# Patient Record
Sex: FEMALE | Race: BLACK OR AFRICAN AMERICAN | NOT HISPANIC OR LATINO | URBAN - METROPOLITAN AREA
[De-identification: names, ages, dates, MRNs, and addresses within clinical notes are randomized per-mention and may not be internally consistent; named-entity substitution may affect disease eponyms.]

---

## 2021-09-27 ENCOUNTER — INPATIENT (INPATIENT)
Facility: HOSPITAL | Age: 71
LOS: 6 days | Discharge: HOME | End: 2021-10-04
Attending: INTERNAL MEDICINE | Admitting: INTERNAL MEDICINE
Payer: MEDICAID

## 2021-09-27 VITALS
TEMPERATURE: 98 F | WEIGHT: 169.98 LBS | SYSTOLIC BLOOD PRESSURE: 167 MMHG | HEART RATE: 101 BPM | RESPIRATION RATE: 19 BRPM | OXYGEN SATURATION: 98 % | DIASTOLIC BLOOD PRESSURE: 102 MMHG | HEIGHT: 72 IN

## 2021-09-27 DIAGNOSIS — R53.1 WEAKNESS: ICD-10-CM

## 2021-09-27 DIAGNOSIS — E78.5 HYPERLIPIDEMIA, UNSPECIFIED: ICD-10-CM

## 2021-09-27 DIAGNOSIS — I50.9 HEART FAILURE, UNSPECIFIED: ICD-10-CM

## 2021-09-27 DIAGNOSIS — I10 ESSENTIAL (PRIMARY) HYPERTENSION: ICD-10-CM

## 2021-09-27 DIAGNOSIS — E11.8 TYPE 2 DIABETES MELLITUS WITH UNSPECIFIED COMPLICATIONS: ICD-10-CM

## 2021-09-27 DIAGNOSIS — G20 PARKINSON'S DISEASE: ICD-10-CM

## 2021-09-27 DIAGNOSIS — E87.5 HYPERKALEMIA: ICD-10-CM

## 2021-09-27 LAB
ALBUMIN SERPL ELPH-MCNC: 3.3 G/DL — SIGNIFICANT CHANGE UP (ref 3.3–5)
ALP SERPL-CCNC: 98 U/L — SIGNIFICANT CHANGE UP (ref 40–120)
ALT FLD-CCNC: 29 U/L — SIGNIFICANT CHANGE UP (ref 12–78)
ANION GAP SERPL CALC-SCNC: 3 MMOL/L — LOW (ref 5–17)
AST SERPL-CCNC: 43 U/L — HIGH (ref 15–37)
BASOPHILS # BLD AUTO: 0.05 K/UL — SIGNIFICANT CHANGE UP (ref 0–0.2)
BASOPHILS NFR BLD AUTO: 0.8 % — SIGNIFICANT CHANGE UP (ref 0–2)
BILIRUB SERPL-MCNC: 0.8 MG/DL — SIGNIFICANT CHANGE UP (ref 0.2–1.2)
BUN SERPL-MCNC: 11 MG/DL — SIGNIFICANT CHANGE UP (ref 7–23)
CALCIUM SERPL-MCNC: 9.2 MG/DL — SIGNIFICANT CHANGE UP (ref 8.5–10.1)
CHLORIDE SERPL-SCNC: 102 MMOL/L — SIGNIFICANT CHANGE UP (ref 96–108)
CK SERPL-CCNC: 60 U/L — SIGNIFICANT CHANGE UP (ref 26–192)
CO2 SERPL-SCNC: 27 MMOL/L — SIGNIFICANT CHANGE UP (ref 22–31)
CREAT SERPL-MCNC: 0.71 MG/DL — SIGNIFICANT CHANGE UP (ref 0.5–1.3)
EOSINOPHIL # BLD AUTO: 0.01 K/UL — SIGNIFICANT CHANGE UP (ref 0–0.5)
EOSINOPHIL NFR BLD AUTO: 0.2 % — SIGNIFICANT CHANGE UP (ref 0–6)
FLUAV AG NPH QL: SIGNIFICANT CHANGE UP
FLUBV AG NPH QL: SIGNIFICANT CHANGE UP
GLUCOSE BLDC GLUCOMTR-MCNC: 110 MG/DL — HIGH (ref 70–99)
GLUCOSE BLDC GLUCOMTR-MCNC: 210 MG/DL — HIGH (ref 70–99)
GLUCOSE SERPL-MCNC: 136 MG/DL — HIGH (ref 70–99)
HCT VFR BLD CALC: 45.5 % — HIGH (ref 34.5–45)
HGB BLD-MCNC: 15.3 G/DL — SIGNIFICANT CHANGE UP (ref 11.5–15.5)
IMM GRANULOCYTES NFR BLD AUTO: 0.2 % — SIGNIFICANT CHANGE UP (ref 0–1.5)
LYMPHOCYTES # BLD AUTO: 1.94 K/UL — SIGNIFICANT CHANGE UP (ref 1–3.3)
LYMPHOCYTES # BLD AUTO: 31.6 % — SIGNIFICANT CHANGE UP (ref 13–44)
MCHC RBC-ENTMCNC: 30.2 PG — SIGNIFICANT CHANGE UP (ref 27–34)
MCHC RBC-ENTMCNC: 33.6 GM/DL — SIGNIFICANT CHANGE UP (ref 32–36)
MCV RBC AUTO: 89.7 FL — SIGNIFICANT CHANGE UP (ref 80–100)
MONOCYTES # BLD AUTO: 0.49 K/UL — SIGNIFICANT CHANGE UP (ref 0–0.9)
MONOCYTES NFR BLD AUTO: 8 % — SIGNIFICANT CHANGE UP (ref 2–14)
NEUTROPHILS # BLD AUTO: 3.63 K/UL — SIGNIFICANT CHANGE UP (ref 1.8–7.4)
NEUTROPHILS NFR BLD AUTO: 59.2 % — SIGNIFICANT CHANGE UP (ref 43–77)
NRBC # BLD: 0 /100 WBCS — SIGNIFICANT CHANGE UP (ref 0–0)
PLATELET # BLD AUTO: 319 K/UL — SIGNIFICANT CHANGE UP (ref 150–400)
POTASSIUM SERPL-MCNC: 5.5 MMOL/L — HIGH (ref 3.5–5.3)
POTASSIUM SERPL-SCNC: 5.5 MMOL/L — HIGH (ref 3.5–5.3)
PROT SERPL-MCNC: 7.7 GM/DL — SIGNIFICANT CHANGE UP (ref 6–8.3)
RBC # BLD: 5.07 M/UL — SIGNIFICANT CHANGE UP (ref 3.8–5.2)
RBC # FLD: 12.3 % — SIGNIFICANT CHANGE UP (ref 10.3–14.5)
SARS-COV-2 RNA SPEC QL NAA+PROBE: SIGNIFICANT CHANGE UP
SODIUM SERPL-SCNC: 132 MMOL/L — LOW (ref 135–145)
TROPONIN I SERPL-MCNC: <.015 NG/ML — SIGNIFICANT CHANGE UP (ref 0.01–0.04)
TSH SERPL-MCNC: 0.76 UIU/ML — SIGNIFICANT CHANGE UP (ref 0.36–3.74)
WBC # BLD: 6.13 K/UL — SIGNIFICANT CHANGE UP (ref 3.8–10.5)
WBC # FLD AUTO: 6.13 K/UL — SIGNIFICANT CHANGE UP (ref 3.8–10.5)

## 2021-09-27 PROCEDURE — 71045 X-RAY EXAM CHEST 1 VIEW: CPT | Mod: 26

## 2021-09-27 PROCEDURE — 70450 CT HEAD/BRAIN W/O DYE: CPT | Mod: 26,MA

## 2021-09-27 PROCEDURE — 99221 1ST HOSP IP/OBS SF/LOW 40: CPT

## 2021-09-27 PROCEDURE — 99285 EMERGENCY DEPT VISIT HI MDM: CPT

## 2021-09-27 PROCEDURE — 93010 ELECTROCARDIOGRAM REPORT: CPT

## 2021-09-27 RX ORDER — DEXTROSE 50 % IN WATER 50 %
25 SYRINGE (ML) INTRAVENOUS ONCE
Refills: 0 | Status: DISCONTINUED | OUTPATIENT
Start: 2021-09-27 | End: 2021-10-04

## 2021-09-27 RX ORDER — GLUCAGON INJECTION, SOLUTION 0.5 MG/.1ML
1 INJECTION, SOLUTION SUBCUTANEOUS ONCE
Refills: 0 | Status: DISCONTINUED | OUTPATIENT
Start: 2021-09-27 | End: 2021-10-04

## 2021-09-27 RX ORDER — SODIUM CHLORIDE 9 MG/ML
1000 INJECTION, SOLUTION INTRAVENOUS
Refills: 0 | Status: DISCONTINUED | OUTPATIENT
Start: 2021-09-27 | End: 2021-10-04

## 2021-09-27 RX ORDER — CARBIDOPA AND LEVODOPA 25; 100 MG/1; MG/1
1 TABLET ORAL ONCE
Refills: 0 | Status: COMPLETED | OUTPATIENT
Start: 2021-09-27 | End: 2021-09-27

## 2021-09-27 RX ORDER — LANOLIN ALCOHOL/MO/W.PET/CERES
3 CREAM (GRAM) TOPICAL AT BEDTIME
Refills: 0 | Status: DISCONTINUED | OUTPATIENT
Start: 2021-09-27 | End: 2021-10-04

## 2021-09-27 RX ORDER — INSULIN LISPRO 100/ML
VIAL (ML) SUBCUTANEOUS
Refills: 0 | Status: DISCONTINUED | OUTPATIENT
Start: 2021-09-27 | End: 2021-10-04

## 2021-09-27 RX ORDER — ENOXAPARIN SODIUM 100 MG/ML
40 INJECTION SUBCUTANEOUS DAILY
Refills: 0 | Status: DISCONTINUED | OUTPATIENT
Start: 2021-09-27 | End: 2021-10-04

## 2021-09-27 RX ORDER — ACETAMINOPHEN 500 MG
650 TABLET ORAL EVERY 6 HOURS
Refills: 0 | Status: DISCONTINUED | OUTPATIENT
Start: 2021-09-27 | End: 2021-10-04

## 2021-09-27 RX ORDER — CARBIDOPA AND LEVODOPA 25; 100 MG/1; MG/1
1 TABLET ORAL
Refills: 0 | Status: DISCONTINUED | OUTPATIENT
Start: 2021-09-27 | End: 2021-09-28

## 2021-09-27 RX ORDER — SODIUM POLYSTYRENE SULFONATE 4.1 MEQ/G
15 POWDER, FOR SUSPENSION ORAL ONCE
Refills: 0 | Status: COMPLETED | OUTPATIENT
Start: 2021-09-27 | End: 2021-09-27

## 2021-09-27 RX ORDER — DEXTROSE 50 % IN WATER 50 %
15 SYRINGE (ML) INTRAVENOUS ONCE
Refills: 0 | Status: DISCONTINUED | OUTPATIENT
Start: 2021-09-27 | End: 2021-10-04

## 2021-09-27 RX ORDER — ATORVASTATIN CALCIUM 80 MG/1
40 TABLET, FILM COATED ORAL AT BEDTIME
Refills: 0 | Status: DISCONTINUED | OUTPATIENT
Start: 2021-09-27 | End: 2021-10-04

## 2021-09-27 RX ORDER — SODIUM CHLORIDE 9 MG/ML
1000 INJECTION INTRAMUSCULAR; INTRAVENOUS; SUBCUTANEOUS ONCE
Refills: 0 | Status: COMPLETED | OUTPATIENT
Start: 2021-09-27 | End: 2021-09-27

## 2021-09-27 RX ORDER — INSULIN LISPRO 100/ML
VIAL (ML) SUBCUTANEOUS AT BEDTIME
Refills: 0 | Status: DISCONTINUED | OUTPATIENT
Start: 2021-09-27 | End: 2021-10-04

## 2021-09-27 RX ORDER — DEXTROSE 50 % IN WATER 50 %
12.5 SYRINGE (ML) INTRAVENOUS ONCE
Refills: 0 | Status: DISCONTINUED | OUTPATIENT
Start: 2021-09-27 | End: 2021-10-04

## 2021-09-27 RX ORDER — SODIUM ZIRCONIUM CYCLOSILICATE 10 G/10G
10 POWDER, FOR SUSPENSION ORAL ONCE
Refills: 0 | Status: DISCONTINUED | OUTPATIENT
Start: 2021-09-27 | End: 2021-09-27

## 2021-09-27 RX ORDER — AMLODIPINE BESYLATE 2.5 MG/1
5 TABLET ORAL DAILY
Refills: 0 | Status: DISCONTINUED | OUTPATIENT
Start: 2021-09-27 | End: 2021-10-04

## 2021-09-27 RX ADMIN — SODIUM POLYSTYRENE SULFONATE 15 GRAM(S): 4.1 POWDER, FOR SUSPENSION ORAL at 16:41

## 2021-09-27 RX ADMIN — CARBIDOPA AND LEVODOPA 1 TABLET(S): 25; 100 TABLET ORAL at 16:41

## 2021-09-27 RX ADMIN — AMLODIPINE BESYLATE 5 MILLIGRAM(S): 2.5 TABLET ORAL at 16:41

## 2021-09-27 RX ADMIN — CARBIDOPA AND LEVODOPA 1 TABLET(S): 25; 100 TABLET ORAL at 19:00

## 2021-09-27 RX ADMIN — ENOXAPARIN SODIUM 40 MILLIGRAM(S): 100 INJECTION SUBCUTANEOUS at 16:41

## 2021-09-27 RX ADMIN — ATORVASTATIN CALCIUM 40 MILLIGRAM(S): 80 TABLET, FILM COATED ORAL at 22:30

## 2021-09-27 RX ADMIN — SODIUM CHLORIDE 1000 MILLILITER(S): 9 INJECTION INTRAMUSCULAR; INTRAVENOUS; SUBCUTANEOUS at 16:43

## 2021-09-27 RX ADMIN — CARBIDOPA AND LEVODOPA 1 TABLET(S): 25; 100 TABLET ORAL at 23:34

## 2021-09-27 NOTE — PHYSICAL THERAPY INITIAL EVALUATION ADULT - STRENGTHENING, PT EVAL
Improve strength in B UE/LE to 5/5  and be able to perform functional tasks-bed mobility, sitting, standing, transfers and ambulate in a safe manner with or without  assistive device and prevent falls.

## 2021-09-27 NOTE — ED ADULT NURSE NOTE - OBJECTIVE STATEMENT
pt alert awake interactive. pt states recently diagnosed with parkinson's this year, c.o of worsening weakness, inability to walk and uses wheelchair at home. pt denies pain no falls at home recently. diet has been poor appetite  no known difficulty swallowing.

## 2021-09-27 NOTE — PHYSICAL THERAPY INITIAL EVALUATION ADULT - DID THE PATIENT HAVE SURGERY?
This is the hx of JASMIN a 70 y/o female patient who was admitted to [facility] due to complications of Parkinson's disease affecting medical condition and with subsequent affection on functional mobility./n/a

## 2021-09-27 NOTE — ED ADULT NURSE NOTE - NSICDXPASTMEDICALHX_GEN_ALL_CORE_FT
PAST MEDICAL HISTORY:  DM (diabetes mellitus)     Heart failure     Mild HTN     Parkinsons     Parkinsons

## 2021-09-27 NOTE — PHYSICAL THERAPY INITIAL EVALUATION ADULT - BALANCE TRAINING, PT EVAL
Pt will increase static/dynamic standing balance to WFL to perform all functional mobility without LOB, by 2-4  weeks

## 2021-09-27 NOTE — ED ADULT NURSE NOTE - NSIMPLEMENTINTERV_GEN_ALL_ED
Implemented All Fall with Harm Risk Interventions:  [place] to call system. Call bell, personal items and telephone within reach. Instruct patient to call for assistance. Room bathroom lighting operational. Non-slip footwear when patient is off stretcher. Physically safe environment: no spills, clutter or unnecessary equipment. Stretcher in lowest position, wheels locked, appropriate side rails in place. Provide visual cue, wrist band, yellow gown, etc. Monitor gait and stability. Monitor for mental status changes and reorient to person, place, and time. Review medications for side effects contributing to fall risk. Reinforce activity limits and safety measures with patient and family. Provide visual clues: red socks.

## 2021-09-27 NOTE — H&P ADULT - PROBLEM SELECTOR PLAN 4
Patient does not remember home medications  Informed patient's to ask family member to bring home medication list  Continue amlodipine 5mg daily for now with holding parameters

## 2021-09-27 NOTE — PHYSICAL THERAPY INITIAL EVALUATION ADULT - ADDITIONAL COMMENTS
as per patient she lives at home with sister and brother in Law + nephew, pt has 3 steps with 2 HR's (close together), pt was independent with rolling walker. pt owns a walker, 3:1 commode, cane, w/c. pt lives in the first floor and sister's family lives in the second floor.

## 2021-09-27 NOTE — H&P ADULT - NSHPREVIEWOFSYSTEMS_GEN_ALL_CORE
CONSTITUTIONAL: No fever, chills or weight loss. Generalized weakness +  EYES: No eye pain. No vision changes  ENT:  No hearing changes or pain, No nasal congestion.   Cardiovascular:  No Chest Pain, palpitation, SOB orthopnea or PND.   Respiratory: no cough, SOB, wheezing  Gastrointestinal:  No nausea, vomiting, diarrhea, constipation or abdominal pain  Genitourinary: No dysuria, frequency or hematuria  Musculoskeletal:  No myalgia or joint pain  Skin:  No skin lesion, rash or pruritis  Neuro:  generalized weakness  Psych:  Reportedly sad due to Parkinson  Heme/Lymph:  No easy bruising or bleeding. No enlarged lymph nodes  Endocrine:  No Polyuria, polydipsia, No heat or cold intolerance.  ALLERGIC/IMMUNOlOGIC: No seasonal allergy, hives, hay fever symptoms

## 2021-09-27 NOTE — H&P ADULT - ASSESSMENT
71 years old female with h/o HTN, HLD, CHF, DM2, recently diagnosed Parkinson's diease present to ED with complain of worsening generalized weakness for 2 weeks.  Hemodynamically stable. No leukocytosis. K 5.5, trop negative. CT head with no acute pathology, mid to moderate chronic microvascular changes. ED consulted neurology Dr Malone. Given 1 dose of Sinemet 25/100 and Lokelma 10G in ED.  Patient live with her sister and ambulate with wheelchair. Her sister reportedly has MS as well. Difficulty in taking care of herself due to weakness  Called [facility] pharmacy ( [phone]). They are not sure of patient's regular medications She was discharged in July with Sinemet 25/100 qid. Other medications at different point of times include lasix 40mg daily, metformin 500mg, amlodipine 5mg, atorvastatin 40mg, metoprolol ER 50, gabapentin 100mg but they are not sure which one of them are current.     Admitted with generalized weakness due to worsening Parkinson's disease, hyperkalemia 71 years old female with h/o HTN, HLD, CHF, DM2, recently diagnosed Parkinson's diease present to ED with complain of worsening generalized weakness for 2 weeks.  Hemodynamically stable. No leukocytosis. K 5.5, trop negative. CT head with no acute pathology, mid to moderate chronic microvascular changes. ED consulted neurology Dr Malone. Given 1 dose of Sinemet 25/100  in ED.  Patient live with her sister and ambulate with wheelchair. Her sister reportedly has MS as well. Difficulty in taking care of herself due to weakness  Called [facility] pharmacy ( [phone]). They are not sure of patient's regular medications She was discharged in July with Sinemet 25/100 qid. Other medications at different point of times include lasix 40mg daily, metformin 500mg, amlodipine 5mg, atorvastatin 40mg, metoprolol ER 50, gabapentin 100mg but they are not sure which one of them are current.     Admitted with generalized weakness due to worsening Parkinson's disease, hyperkalemia

## 2021-09-27 NOTE — H&P ADULT - PROBLEM SELECTOR PLAN 3
Present to ED with worsening weakness since diagnosis of Parkinson's disease  Unable to stand up due to weakness  CT head with no acute pathology, mid to moderate chronic microvascular changes  ED consulted neurology Dr Malone. Given 1 dose of Sinemet 25/100 in ED  Continue Sinemet 25/100 qid for now  PT consult

## 2021-09-27 NOTE — PHYSICAL THERAPY INITIAL EVALUATION ADULT - PERTINENT HX OF CURRENT PROBLEM, REHAB EVAL
This is the hx of JASMIN a 72 y/o female patient who was admitted to [facility] [place] [place] due to complications of Parkinson's disease affecting medical condition and with subsequent affection on functional mobility.

## 2021-09-27 NOTE — H&P ADULT - NSHPPHYSICALEXAM_GEN_ALL_CORE
CONSTITUTIONAL: Well developed, well nourished, alert and cooperative, no acute distress. BP-167/102 , HR-101 , RR-19  EYES: PERRL, EOMI, no scleral icterus  ENT: Mucosa moist, tongue normal  NECK: Neck supple, trachea midline, non-tender, no masses or thyromegaly.  CARDIAC: Normal S1 and S2. Regular rate and rhythms. No murmurs. No Pedal edema. Peripheral pulses intact  LUNGS: Clear to auscultation, equal air entry both lungs. No rales, rhonchi, wheezing. Normal respiratory effort.   ABDOMEN: Soft, nondistended, nontender. No guarding or rebound tenderness. No hepatomegaly or splenomegaly  MUSCULOSKELETAL: Normocephalic, atraumatic. Spine normal without deformity or tenderness. No clubbing or cyanosis. No significant deformity or joint abnormality  NEUROLOGICAL: Sensory intact. Motor bilateral LE 4/5, mild rigidity in bilateral LE noted . CN II-XII grossly intact.  SKIN: no lesions or eruptions. Normal turgor  PSYCHIATRIC: A&O x 3, appear sad and crying. Normal insight and judgement.

## 2021-09-27 NOTE — H&P ADULT - PROBLEM SELECTOR PLAN 7
Reported h/o CHF, unknown EF  Possible on lasix 40mg daily  Clinically appear euvolemic  Hold off diuretic for now until home medications is confirmed

## 2021-09-27 NOTE — PHYSICAL THERAPY INITIAL EVALUATION ADULT - CRITERIA FOR SKILLED THERAPEUTIC INTERVENTIONS
Subacute rehab/impairments found/functional limitations in following categories/risk reduction/prevention/rehab potential/therapy frequency/predicted duration of therapy intervention/anticipated discharge recommendation

## 2021-09-27 NOTE — H&P ADULT - HISTORY OF PRESENT ILLNESS
71 years old female with h/o HTN, HLD, CHF, DM2, recently diagnosed Parkinson's diease present to ED with complain of worsening generalized weakness for 2 weeks. Patient reported gradual worsening of weakness since diagnosis of Parkinson, but more pronounced over last 2 week. Now patient cannot ambulate due to weakness. She live with her sister and ambulate with a wheelchair. No fever, cough, SOB or chest pain.  Hemodynamically stable. No leukocytosis. K 5.5, trop negative. CT head with no acute pathology, mid to moderate chronic microvascular changes.ED consulted neurology Dr Malone. Given 1 dose of Sinemet 25/100 and Lokelma 10G in ED  Called [facility] pharmacy ( [phone]). They are not sure of patient's regular medications She was discharged in July with Sinemet 25/100 qid. Other medications at different point of times include lasix 40mg daily, metformin 500mg, amlodipine 5mg, atorvastatin 40mg, metoprolol ER 50 but they are not sure if patient is still taking them.    PMH: as in HPI  SH: no toxic habits  FH: DM in father and mother 71 years old female with h/o HTN, HLD, CHF, DM2, recently diagnosed Parkinson's diease present to ED with complain of worsening generalized weakness for 2 weeks. Patient reported gradual worsening of weakness since diagnosis of Parkinson, but more pronounced over last 2 week. Now patient cannot ambulate due to weakness. She live with her sister and ambulate with a wheelchair. No fever, cough, SOB or chest pain.  Hemodynamically stable. No leukocytosis. K 5.5, trop negative. CT head with no acute pathology, mid to moderate chronic microvascular changes.ED consulted neurology Dr Malone. Given 1 dose of Sinemet 25/100 in ED  Called [facility] pharmacy ( [phone]). They are not sure of patient's regular medications She was discharged in July with Sinemet 25/100 qid. Other medications at different point of times include lasix 40mg daily, metformin 500mg, amlodipine 5mg, atorvastatin 40mg, metoprolol ER 50 but they are not sure if patient is still taking them.    PMH: as in HPI  SH: no toxic habits  FH: DM in father and mother

## 2021-09-27 NOTE — H&P ADULT - PROBLEM SELECTOR PLAN 2
K 5.5, received Lokelma 10G in ED  Monitor serum potassium K 5.5  Kayexalate 15g ordered  Monitor serum potassium

## 2021-09-27 NOTE — ED PROVIDER NOTE - OBJECTIVE STATEMENT
71 year old female with PMH of CHF, Parkinson's HTN, DM II presenting to ED due to bilateral feet weakness x 3 months. Since diagnosis with problem has had bilateral feet weakness, difficulty speaking. States on medication - levo-carbidopa for issue.

## 2021-09-27 NOTE — PHYSICAL THERAPY INITIAL EVALUATION ADULT - BED MOBILITY TRAINING, PT EVAL
Pt will independently perform all aspects of bed mobility to help prevent pressure ulcers, by 2-4 weeks

## 2021-09-27 NOTE — ED PROVIDER NOTE - CLINICAL SUMMARY MEDICAL DECISION MAKING FREE TEXT BOX
pt with parkinson's disease, worsening, difficulty standing and ambulating, bradykinesia noted on exam, will need dose adjustments on Carbi/Levodopa - unsure of pharmacy - called pharmacy listed but pt has not picked up meds from [facility] before - will admit for further care with Dr. Eddy.

## 2021-09-28 LAB
A1C WITH ESTIMATED AVERAGE GLUCOSE RESULT: 6.6 % — HIGH (ref 4–5.6)
ANION GAP SERPL CALC-SCNC: 7 MMOL/L — SIGNIFICANT CHANGE UP (ref 5–17)
BUN SERPL-MCNC: 10 MG/DL — SIGNIFICANT CHANGE UP (ref 7–23)
CALCIUM SERPL-MCNC: 9.4 MG/DL — SIGNIFICANT CHANGE UP (ref 8.5–10.1)
CHLORIDE SERPL-SCNC: 104 MMOL/L — SIGNIFICANT CHANGE UP (ref 96–108)
CO2 SERPL-SCNC: 26 MMOL/L — SIGNIFICANT CHANGE UP (ref 22–31)
COVID-19 SPIKE DOMAIN AB INTERP: NEGATIVE — SIGNIFICANT CHANGE UP
COVID-19 SPIKE DOMAIN ANTIBODY RESULT: 0.4 U/ML — SIGNIFICANT CHANGE UP
CREAT SERPL-MCNC: 0.46 MG/DL — LOW (ref 0.5–1.3)
ESTIMATED AVERAGE GLUCOSE: 143 MG/DL — HIGH (ref 68–114)
GLUCOSE BLDC GLUCOMTR-MCNC: 129 MG/DL — HIGH (ref 70–99)
GLUCOSE BLDC GLUCOMTR-MCNC: 157 MG/DL — HIGH (ref 70–99)
GLUCOSE BLDC GLUCOMTR-MCNC: 248 MG/DL — HIGH (ref 70–99)
GLUCOSE BLDC GLUCOMTR-MCNC: 99 MG/DL — SIGNIFICANT CHANGE UP (ref 70–99)
GLUCOSE SERPL-MCNC: 105 MG/DL — HIGH (ref 70–99)
HCT VFR BLD CALC: 40.3 % — SIGNIFICANT CHANGE UP (ref 34.5–45)
HCV AB S/CO SERPL IA: 0.43 S/CO — SIGNIFICANT CHANGE UP (ref 0–0.99)
HCV AB SERPL-IMP: SIGNIFICANT CHANGE UP
HGB BLD-MCNC: 13.6 G/DL — SIGNIFICANT CHANGE UP (ref 11.5–15.5)
MAGNESIUM SERPL-MCNC: 2.1 MG/DL — SIGNIFICANT CHANGE UP (ref 1.6–2.6)
MCHC RBC-ENTMCNC: 30.1 PG — SIGNIFICANT CHANGE UP (ref 27–34)
MCHC RBC-ENTMCNC: 33.7 GM/DL — SIGNIFICANT CHANGE UP (ref 32–36)
MCV RBC AUTO: 89.2 FL — SIGNIFICANT CHANGE UP (ref 80–100)
NRBC # BLD: 0 /100 WBCS — SIGNIFICANT CHANGE UP (ref 0–0)
PHOSPHATE SERPL-MCNC: 3.5 MG/DL — SIGNIFICANT CHANGE UP (ref 2.5–4.5)
PLATELET # BLD AUTO: 270 K/UL — SIGNIFICANT CHANGE UP (ref 150–400)
POTASSIUM SERPL-MCNC: 3.2 MMOL/L — LOW (ref 3.5–5.3)
POTASSIUM SERPL-SCNC: 3.2 MMOL/L — LOW (ref 3.5–5.3)
RBC # BLD: 4.52 M/UL — SIGNIFICANT CHANGE UP (ref 3.8–5.2)
RBC # FLD: 12.2 % — SIGNIFICANT CHANGE UP (ref 10.3–14.5)
SARS-COV-2 IGG+IGM SERPL QL IA: 0.4 U/ML — SIGNIFICANT CHANGE UP
SARS-COV-2 IGG+IGM SERPL QL IA: NEGATIVE — SIGNIFICANT CHANGE UP
SODIUM SERPL-SCNC: 137 MMOL/L — SIGNIFICANT CHANGE UP (ref 135–145)
T4 FREE SERPL-MCNC: 1.5 NG/DL — SIGNIFICANT CHANGE UP (ref 0.9–1.8)
WBC # BLD: 5.76 K/UL — SIGNIFICANT CHANGE UP (ref 3.8–10.5)
WBC # FLD AUTO: 5.76 K/UL — SIGNIFICANT CHANGE UP (ref 3.8–10.5)

## 2021-09-28 PROCEDURE — 99232 SBSQ HOSP IP/OBS MODERATE 35: CPT

## 2021-09-28 PROCEDURE — 99254 IP/OBS CNSLTJ NEW/EST MOD 60: CPT

## 2021-09-28 RX ORDER — CARBIDOPA AND LEVODOPA 25; 100 MG/1; MG/1
1 TABLET ORAL
Refills: 0 | Status: DISCONTINUED | OUTPATIENT
Start: 2021-09-28 | End: 2021-09-28

## 2021-09-28 RX ORDER — POTASSIUM CHLORIDE 20 MEQ
40 PACKET (EA) ORAL EVERY 4 HOURS
Refills: 0 | Status: DISCONTINUED | OUTPATIENT
Start: 2021-09-28 | End: 2021-09-28

## 2021-09-28 RX ORDER — INFLUENZA VIRUS VACCINE 15; 15; 15; 15 UG/.5ML; UG/.5ML; UG/.5ML; UG/.5ML
0.5 SUSPENSION INTRAMUSCULAR ONCE
Refills: 0 | Status: COMPLETED | OUTPATIENT
Start: 2021-09-28 | End: 2021-10-04

## 2021-09-28 RX ORDER — CARBIDOPA AND LEVODOPA 25; 100 MG/1; MG/1
1 TABLET ORAL THREE TIMES A DAY
Refills: 0 | Status: DISCONTINUED | OUTPATIENT
Start: 2021-09-28 | End: 2021-10-04

## 2021-09-28 RX ADMIN — Medication 650 MILLIGRAM(S): at 14:18

## 2021-09-28 RX ADMIN — CARBIDOPA AND LEVODOPA 1 TABLET(S): 25; 100 TABLET ORAL at 18:42

## 2021-09-28 RX ADMIN — CARBIDOPA AND LEVODOPA 1 TABLET(S): 25; 100 TABLET ORAL at 05:47

## 2021-09-28 RX ADMIN — ENOXAPARIN SODIUM 40 MILLIGRAM(S): 100 INJECTION SUBCUTANEOUS at 12:03

## 2021-09-28 RX ADMIN — ATORVASTATIN CALCIUM 40 MILLIGRAM(S): 80 TABLET, FILM COATED ORAL at 22:43

## 2021-09-28 RX ADMIN — AMLODIPINE BESYLATE 5 MILLIGRAM(S): 2.5 TABLET ORAL at 05:47

## 2021-09-28 RX ADMIN — Medication 650 MILLIGRAM(S): at 13:11

## 2021-09-28 RX ADMIN — Medication 1: at 18:45

## 2021-09-28 RX ADMIN — Medication 2: at 11:48

## 2021-09-28 RX ADMIN — CARBIDOPA AND LEVODOPA 1 TABLET(S): 25; 100 TABLET ORAL at 11:49

## 2021-09-28 RX ADMIN — CARBIDOPA AND LEVODOPA 1 TABLET(S): 25; 100 TABLET ORAL at 22:43

## 2021-09-28 NOTE — CONSULT NOTE ADULT - SUBJECTIVE AND OBJECTIVE BOX
NEUROLOGY CONSULT    HPI:  72 yo woman reporting that over the past few months she has had gait instability and slow movements, and about 2 weeks ago was told that she may have Parkinson disease which was very upsetting, and she feels her symptoms have gotten worse since she was told this.  She denies any prominent resting tremors, but has had bradykinesia and postural instability.   She has been very unsteady while walking and was using a walker, and now a wheelchair more often.    PMHx: HTN, DM, CHF, DM    Head CT: diffuse atrophy    Hgb A1C = 6.6  TSH = 0.76  CPK = 60    NEUROLOGICAL EXAM:  T 97.4 F  /86  HR 80  MS: Awake, alert, oriented x 4, language fluent, comprehension intact, naming intact, repetition intact, reduced facial expression  CN: PERRLA, EOMI, visual fields intact, facial sensation intact, face symmetric, no dysarthria, symmetric palatal elevation, tongue midline, symmetric shoulder shrug and SCM strength  Motor: normal bulk, diffuse rigidity, power 5/5 in all four extremities, no tremors or fasciculations, +bradykinesia in all four limbs  Sensation: intact to light touch, proprioception, impaired vibrations sense in toes bilaterally  Reflexes: 1+ at biceps, brachioradialis, patella, ankle bilaterally.  Flexor plantar response bilaterally.  No clonus  Coordination: no dysmetria    Assessment:  72 yo woman with Parkinsonism.  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    Recommendations:  1. Agree with trial of Sinemet 25/100mg PO TID  2. PT/OT  3. would benefit from consultation with Movement disorders specialist ( ie Karon Palumbo, or Tremaine in [place], can be arranged as outpatient upon follow up)    Fernando Malone MD  [facility] Department of Neurology  Epilepsy Center

## 2021-09-29 LAB
ANION GAP SERPL CALC-SCNC: 4 MMOL/L — LOW (ref 5–17)
BUN SERPL-MCNC: 11 MG/DL — SIGNIFICANT CHANGE UP (ref 7–23)
CALCIUM SERPL-MCNC: 9.1 MG/DL — SIGNIFICANT CHANGE UP (ref 8.5–10.1)
CHLORIDE SERPL-SCNC: 106 MMOL/L — SIGNIFICANT CHANGE UP (ref 96–108)
CO2 SERPL-SCNC: 29 MMOL/L — SIGNIFICANT CHANGE UP (ref 22–31)
CREAT SERPL-MCNC: 0.68 MG/DL — SIGNIFICANT CHANGE UP (ref 0.5–1.3)
GLUCOSE BLDC GLUCOMTR-MCNC: 122 MG/DL — HIGH (ref 70–99)
GLUCOSE BLDC GLUCOMTR-MCNC: 223 MG/DL — HIGH (ref 70–99)
GLUCOSE BLDC GLUCOMTR-MCNC: 225 MG/DL — HIGH (ref 70–99)
GLUCOSE BLDC GLUCOMTR-MCNC: 246 MG/DL — HIGH (ref 70–99)
GLUCOSE SERPL-MCNC: 135 MG/DL — HIGH (ref 70–99)
POTASSIUM SERPL-MCNC: 4.4 MMOL/L — SIGNIFICANT CHANGE UP (ref 3.5–5.3)
POTASSIUM SERPL-SCNC: 4.4 MMOL/L — SIGNIFICANT CHANGE UP (ref 3.5–5.3)
SODIUM SERPL-SCNC: 139 MMOL/L — SIGNIFICANT CHANGE UP (ref 135–145)

## 2021-09-29 PROCEDURE — 99232 SBSQ HOSP IP/OBS MODERATE 35: CPT

## 2021-09-29 RX ADMIN — ATORVASTATIN CALCIUM 40 MILLIGRAM(S): 80 TABLET, FILM COATED ORAL at 22:37

## 2021-09-29 RX ADMIN — CARBIDOPA AND LEVODOPA 1 TABLET(S): 25; 100 TABLET ORAL at 06:18

## 2021-09-29 RX ADMIN — CARBIDOPA AND LEVODOPA 1 TABLET(S): 25; 100 TABLET ORAL at 13:50

## 2021-09-29 RX ADMIN — Medication 2: at 12:28

## 2021-09-29 RX ADMIN — AMLODIPINE BESYLATE 5 MILLIGRAM(S): 2.5 TABLET ORAL at 06:17

## 2021-09-29 RX ADMIN — Medication 2: at 17:38

## 2021-09-29 RX ADMIN — CARBIDOPA AND LEVODOPA 1 TABLET(S): 25; 100 TABLET ORAL at 22:37

## 2021-09-29 RX ADMIN — ENOXAPARIN SODIUM 40 MILLIGRAM(S): 100 INJECTION SUBCUTANEOUS at 12:30

## 2021-09-29 RX ADMIN — Medication 3 MILLIGRAM(S): at 22:37

## 2021-09-30 LAB
GLUCOSE BLDC GLUCOMTR-MCNC: 130 MG/DL — HIGH (ref 70–99)
GLUCOSE BLDC GLUCOMTR-MCNC: 210 MG/DL — HIGH (ref 70–99)
GLUCOSE BLDC GLUCOMTR-MCNC: 212 MG/DL — HIGH (ref 70–99)
GLUCOSE BLDC GLUCOMTR-MCNC: 248 MG/DL — HIGH (ref 70–99)

## 2021-09-30 PROCEDURE — 99232 SBSQ HOSP IP/OBS MODERATE 35: CPT

## 2021-09-30 RX ORDER — ATORVASTATIN CALCIUM 80 MG/1
1 TABLET, FILM COATED ORAL
Qty: 0 | Refills: 0 | DISCHARGE
Start: 2021-09-30

## 2021-09-30 RX ORDER — AMLODIPINE BESYLATE 2.5 MG/1
1 TABLET ORAL
Qty: 0 | Refills: 0 | DISCHARGE
Start: 2021-09-30

## 2021-09-30 RX ORDER — CARBIDOPA AND LEVODOPA 25; 100 MG/1; MG/1
1 TABLET ORAL
Qty: 0 | Refills: 0 | DISCHARGE
Start: 2021-09-30

## 2021-09-30 RX ADMIN — Medication 0: at 22:23

## 2021-09-30 RX ADMIN — Medication 2: at 11:17

## 2021-09-30 RX ADMIN — ATORVASTATIN CALCIUM 40 MILLIGRAM(S): 80 TABLET, FILM COATED ORAL at 22:23

## 2021-09-30 RX ADMIN — ENOXAPARIN SODIUM 40 MILLIGRAM(S): 100 INJECTION SUBCUTANEOUS at 11:18

## 2021-09-30 RX ADMIN — CARBIDOPA AND LEVODOPA 1 TABLET(S): 25; 100 TABLET ORAL at 22:23

## 2021-09-30 RX ADMIN — AMLODIPINE BESYLATE 5 MILLIGRAM(S): 2.5 TABLET ORAL at 05:34

## 2021-09-30 RX ADMIN — CARBIDOPA AND LEVODOPA 1 TABLET(S): 25; 100 TABLET ORAL at 14:27

## 2021-09-30 RX ADMIN — Medication 2: at 16:55

## 2021-09-30 RX ADMIN — CARBIDOPA AND LEVODOPA 1 TABLET(S): 25; 100 TABLET ORAL at 05:34

## 2021-10-01 LAB
FLUAV AG NPH QL: SIGNIFICANT CHANGE UP
FLUBV AG NPH QL: SIGNIFICANT CHANGE UP
GLUCOSE BLDC GLUCOMTR-MCNC: 130 MG/DL — HIGH (ref 70–99)
GLUCOSE BLDC GLUCOMTR-MCNC: 182 MG/DL — HIGH (ref 70–99)
GLUCOSE BLDC GLUCOMTR-MCNC: 253 MG/DL — HIGH (ref 70–99)
GLUCOSE BLDC GLUCOMTR-MCNC: 271 MG/DL — HIGH (ref 70–99)
SARS-COV-2 RNA SPEC QL NAA+PROBE: SIGNIFICANT CHANGE UP

## 2021-10-01 PROCEDURE — 99232 SBSQ HOSP IP/OBS MODERATE 35: CPT

## 2021-10-01 RX ADMIN — ATORVASTATIN CALCIUM 40 MILLIGRAM(S): 80 TABLET, FILM COATED ORAL at 21:10

## 2021-10-01 RX ADMIN — Medication 3: at 12:06

## 2021-10-01 RX ADMIN — CARBIDOPA AND LEVODOPA 1 TABLET(S): 25; 100 TABLET ORAL at 05:30

## 2021-10-01 RX ADMIN — Medication 1: at 16:35

## 2021-10-01 RX ADMIN — ENOXAPARIN SODIUM 40 MILLIGRAM(S): 100 INJECTION SUBCUTANEOUS at 12:07

## 2021-10-01 RX ADMIN — AMLODIPINE BESYLATE 5 MILLIGRAM(S): 2.5 TABLET ORAL at 05:30

## 2021-10-01 RX ADMIN — CARBIDOPA AND LEVODOPA 1 TABLET(S): 25; 100 TABLET ORAL at 21:11

## 2021-10-01 RX ADMIN — CARBIDOPA AND LEVODOPA 1 TABLET(S): 25; 100 TABLET ORAL at 13:10

## 2021-10-01 RX ADMIN — Medication 1: at 21:10

## 2021-10-02 LAB
GLUCOSE BLDC GLUCOMTR-MCNC: 135 MG/DL — HIGH (ref 70–99)
GLUCOSE BLDC GLUCOMTR-MCNC: 136 MG/DL — HIGH (ref 70–99)
GLUCOSE BLDC GLUCOMTR-MCNC: 214 MG/DL — HIGH (ref 70–99)
GLUCOSE BLDC GLUCOMTR-MCNC: 240 MG/DL — HIGH (ref 70–99)

## 2021-10-02 PROCEDURE — 99232 SBSQ HOSP IP/OBS MODERATE 35: CPT

## 2021-10-02 RX ADMIN — ENOXAPARIN SODIUM 40 MILLIGRAM(S): 100 INJECTION SUBCUTANEOUS at 12:38

## 2021-10-02 RX ADMIN — CARBIDOPA AND LEVODOPA 1 TABLET(S): 25; 100 TABLET ORAL at 05:39

## 2021-10-02 RX ADMIN — CARBIDOPA AND LEVODOPA 1 TABLET(S): 25; 100 TABLET ORAL at 22:14

## 2021-10-02 RX ADMIN — Medication 2: at 12:38

## 2021-10-02 RX ADMIN — ATORVASTATIN CALCIUM 40 MILLIGRAM(S): 80 TABLET, FILM COATED ORAL at 22:14

## 2021-10-02 RX ADMIN — AMLODIPINE BESYLATE 5 MILLIGRAM(S): 2.5 TABLET ORAL at 05:39

## 2021-10-02 RX ADMIN — CARBIDOPA AND LEVODOPA 1 TABLET(S): 25; 100 TABLET ORAL at 14:28

## 2021-10-03 LAB
ANION GAP SERPL CALC-SCNC: 4 MMOL/L — LOW (ref 5–17)
BUN SERPL-MCNC: 14 MG/DL — SIGNIFICANT CHANGE UP (ref 7–23)
CALCIUM SERPL-MCNC: 9.7 MG/DL — SIGNIFICANT CHANGE UP (ref 8.5–10.1)
CHLORIDE SERPL-SCNC: 100 MMOL/L — SIGNIFICANT CHANGE UP (ref 96–108)
CO2 SERPL-SCNC: 30 MMOL/L — SIGNIFICANT CHANGE UP (ref 22–31)
CREAT SERPL-MCNC: 0.58 MG/DL — SIGNIFICANT CHANGE UP (ref 0.5–1.3)
GLUCOSE BLDC GLUCOMTR-MCNC: 154 MG/DL — HIGH (ref 70–99)
GLUCOSE BLDC GLUCOMTR-MCNC: 187 MG/DL — HIGH (ref 70–99)
GLUCOSE BLDC GLUCOMTR-MCNC: 188 MG/DL — HIGH (ref 70–99)
GLUCOSE BLDC GLUCOMTR-MCNC: 199 MG/DL — HIGH (ref 70–99)
GLUCOSE SERPL-MCNC: 162 MG/DL — HIGH (ref 70–99)
HCT VFR BLD CALC: 43.6 % — SIGNIFICANT CHANGE UP (ref 34.5–45)
HGB BLD-MCNC: 14.1 G/DL — SIGNIFICANT CHANGE UP (ref 11.5–15.5)
MCHC RBC-ENTMCNC: 29.6 PG — SIGNIFICANT CHANGE UP (ref 27–34)
MCHC RBC-ENTMCNC: 32.3 GM/DL — SIGNIFICANT CHANGE UP (ref 32–36)
MCV RBC AUTO: 91.4 FL — SIGNIFICANT CHANGE UP (ref 80–100)
NRBC # BLD: 0 /100 WBCS — SIGNIFICANT CHANGE UP (ref 0–0)
PLATELET # BLD AUTO: 258 K/UL — SIGNIFICANT CHANGE UP (ref 150–400)
POTASSIUM SERPL-MCNC: 4.2 MMOL/L — SIGNIFICANT CHANGE UP (ref 3.5–5.3)
POTASSIUM SERPL-SCNC: 4.2 MMOL/L — SIGNIFICANT CHANGE UP (ref 3.5–5.3)
RBC # BLD: 4.77 M/UL — SIGNIFICANT CHANGE UP (ref 3.8–5.2)
RBC # FLD: 12.3 % — SIGNIFICANT CHANGE UP (ref 10.3–14.5)
SODIUM SERPL-SCNC: 134 MMOL/L — LOW (ref 135–145)
WBC # BLD: 5.51 K/UL — SIGNIFICANT CHANGE UP (ref 3.8–10.5)
WBC # FLD AUTO: 5.51 K/UL — SIGNIFICANT CHANGE UP (ref 3.8–10.5)

## 2021-10-03 PROCEDURE — 99232 SBSQ HOSP IP/OBS MODERATE 35: CPT

## 2021-10-03 RX ADMIN — Medication 1: at 08:09

## 2021-10-03 RX ADMIN — Medication 1: at 16:34

## 2021-10-03 RX ADMIN — CARBIDOPA AND LEVODOPA 1 TABLET(S): 25; 100 TABLET ORAL at 05:14

## 2021-10-03 RX ADMIN — ENOXAPARIN SODIUM 40 MILLIGRAM(S): 100 INJECTION SUBCUTANEOUS at 12:26

## 2021-10-03 RX ADMIN — Medication 1: at 12:27

## 2021-10-03 RX ADMIN — Medication 650 MILLIGRAM(S): at 18:43

## 2021-10-03 RX ADMIN — CARBIDOPA AND LEVODOPA 1 TABLET(S): 25; 100 TABLET ORAL at 16:34

## 2021-10-03 RX ADMIN — CARBIDOPA AND LEVODOPA 1 TABLET(S): 25; 100 TABLET ORAL at 21:10

## 2021-10-03 RX ADMIN — AMLODIPINE BESYLATE 5 MILLIGRAM(S): 2.5 TABLET ORAL at 05:15

## 2021-10-03 RX ADMIN — Medication 650 MILLIGRAM(S): at 18:58

## 2021-10-03 RX ADMIN — ATORVASTATIN CALCIUM 40 MILLIGRAM(S): 80 TABLET, FILM COATED ORAL at 21:10

## 2021-10-04 VITALS — HEART RATE: 87 BPM | OXYGEN SATURATION: 97 % | DIASTOLIC BLOOD PRESSURE: 83 MMHG | SYSTOLIC BLOOD PRESSURE: 145 MMHG

## 2021-10-04 LAB
GLUCOSE BLDC GLUCOMTR-MCNC: 138 MG/DL — HIGH (ref 70–99)
GLUCOSE BLDC GLUCOMTR-MCNC: 153 MG/DL — HIGH (ref 70–99)
GLUCOSE BLDC GLUCOMTR-MCNC: 184 MG/DL — HIGH (ref 70–99)
GLUCOSE BLDC GLUCOMTR-MCNC: 208 MG/DL — HIGH (ref 70–99)
GLUCOSE BLDC GLUCOMTR-MCNC: 214 MG/DL — HIGH (ref 70–99)

## 2021-10-04 PROCEDURE — 99239 HOSP IP/OBS DSCHRG MGMT >30: CPT

## 2021-10-04 RX ORDER — CARBIDOPA AND LEVODOPA 25; 100 MG/1; MG/1
1 TABLET ORAL
Qty: 90 | Refills: 0
Start: 2021-10-04

## 2021-10-04 RX ADMIN — CARBIDOPA AND LEVODOPA 1 TABLET(S): 25; 100 TABLET ORAL at 15:05

## 2021-10-04 RX ADMIN — INFLUENZA VIRUS VACCINE 0.5 MILLILITER(S): 15; 15; 15; 15 SUSPENSION INTRAMUSCULAR at 16:16

## 2021-10-04 RX ADMIN — ATORVASTATIN CALCIUM 40 MILLIGRAM(S): 80 TABLET, FILM COATED ORAL at 21:57

## 2021-10-04 RX ADMIN — AMLODIPINE BESYLATE 5 MILLIGRAM(S): 2.5 TABLET ORAL at 05:32

## 2021-10-04 RX ADMIN — ENOXAPARIN SODIUM 40 MILLIGRAM(S): 100 INJECTION SUBCUTANEOUS at 11:21

## 2021-10-04 RX ADMIN — CARBIDOPA AND LEVODOPA 1 TABLET(S): 25; 100 TABLET ORAL at 05:32

## 2021-10-04 RX ADMIN — Medication 1: at 11:20

## 2021-10-04 RX ADMIN — CARBIDOPA AND LEVODOPA 1 TABLET(S): 25; 100 TABLET ORAL at 21:57

## 2021-10-04 RX ADMIN — Medication 1: at 09:02

## 2021-10-04 NOTE — PROGRESS NOTE ADULT - ASSESSMENT
70 yo woman with Parkinsonism.  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    Recommendations:  1. Continue with Sinemet 25/100mg PO TID  2. PT/OT  3. Can follow up with Neurology, Dr. Aquiles Randolph at [phone] or consultation with Movement disorders specialist ( ie Dr Spaulding, Karon, or Tremaine in [place], can be arranged as outpatient upon follow up)  
71 years old female with h/o HTN, HLD, CHF, DM2, recently diagnosed Parkinson's diease present to ED with complain of worsening generalized weakness for 2 weeks.  Patient live with her sister and ambulate with wheelchair. Her sister reportedly has MS as well. Difficulty in taking care of herself due to weakness    # Parkinson's Disease due to suspected noncompliance with Sinemet.  Responding well to TID dosing.    # Generalized weakness - due to above.  PT input noted.  GLORIA placement.  CM aware.    # Hyperkalemia. - was given Kayexalate on admission, now normalized.   # Essential HTN - Monitor BP.  Continue oral antihypertensives.  # Diabetes Type II - monitor fingersticks.  Insulin coverage for hyperglycemia.  # Hyperlipidemia - diet modification.  Continue Statin.  # Edema.  - history of CHF, TTE unavailable.  Appears euvolemic.  Monitor volume status.  # Inpatient DVT Prophylaxis - Lovenox subcut    Plan GLORIA placement pending insurance auth, discussed with DAVID
71 years old female with h/o HTN, HLD, CHF, DM2, recently diagnosed Parkinson's diease present to ED with complain of worsening generalized weakness for 2 weeks.  Patient live with her sister and ambulate with wheelchair. Her sister reportedly has MS as well. Difficulty in taking care of herself due to weakness    # Parkinson's Disease due to suspected noncompliance with Sinemet.  Responding well to TID dosing.    # Generalized weakness - due to above.  PT input noted.  GLORIA placement.  CM aware.    # Hyperkalemia. - was given Kayexalate on admission, now normalized.   # Essential HTN - Monitor BP.  Continue oral antihypertensives.  # Diabetes Type II - monitor fingersticks.  Insulin coverage for hyperglycemia.  # Hyperlipidemia - diet modification.  Continue Statin.  # Edema.  - history of CHF, TTE unavailable.  Appears euvolemic.  Monitor volume status.  # Inpatient DVT Prophylaxis - Lovenox subcut    Stable for GLORIA placement pending insurance auth, discussed with CM  Routine labs in am.  
71 years old female with h/o HTN, HLD, CHF, DM2, recently diagnosed Parkinson's diease present to ED with complain of worsening generalized weakness for 2 weeks.  Patient live with her sister and ambulate with wheelchair. Her sister reportedly has MS as well. Difficulty in taking care of herself due to weakness    # Parkinson's Disease due to suspected noncompliance with Sinemet.  Responding well to TID dosing.  Neuro input appreciated.    # Generalized weakness - due to above.  PT input noted.  GLORIA placement.  CM aware.    # Hyperkalemia. - K is now low.  Will observe, was given Kayexalate on admission.  # Essential HTN - Monitor BP.  Continue oral antihypertensives.  # Diabetes Type II - monitor fingersticks.  Insulin coverage for hyperglycemia.  # Hyperlipidemia - diet modification.  Continue Statin.  # Edema.  - history of CHF, TTE unavailable.  Appears euvolemic.  Monitor volume status.  # Inpatient DVT Prophylaxis - Lovenox subcut    Plan GLORIA placement pending insurance auth, discussed with CM as pt has unclear insurance coverage. 
72 yo woman with Parkinsonism.  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    Recommendations:  1. Continue with Sinemet 25/100mg PO TID  2. PT/OT  3. Can follow up with Neurology, Dr. Aquiles Randolph at [phone] or consultation with Movement disorders specialist ( ie Dr Spaulding, Karon, or Tremaine in [place], can be arranged as outpatient upon follow up)  
71 years old female with h/o HTN, HLD, CHF, DM2, recently diagnosed Parkinson's diease present to ED with complain of worsening generalized weakness for 2 weeks.  Patient live with her sister and ambulate with wheelchair. Her sister reportedly has MS as well. Difficulty in taking care of herself due to weakness    # Parkinson's Disease due to suspected noncompliance with Sinemet.  Responding well to TID dosing.    # Generalized weakness - due to above.  PT input noted.  CM aware.    # Hyperkalemia. - was given Kayexalate on admission, now normalized.  K stable.   # Essential HTN - Monitor BP, stable  # Diabetes Type II - monitor fingersticks.  Insulin coverage for hyperglycemia.  # Hyperlipidemia - diet modification.  Continue Statin.  # Edema.  - history of CHF, outpatient followup with cardiology.  Appears euvolemic.  Monitor volume status.  # Inpatient DVT Prophylaxis - Lovenox subcut    Pt insured, undocumented.  D/w CM.  D/w pt and family.  Plan d/c home with homecare services.  They were in agreement. 
71 years old female with h/o HTN, HLD, CHF, DM2, recently diagnosed Parkinson's diease present to ED with complain of worsening generalized weakness for 2 weeks.  Patient live with her sister and ambulate with wheelchair. Her sister reportedly has MS as well. Difficulty in taking care of herself due to weakness    # Parkinson's Disease due to suspected noncompliance with Sinemet.  Responding well to TID dosing.    # Generalized weakness - due to above.  PT input noted.  GLORIA placement.  CM aware.    # Hyperkalemia. - was given Kayexalate on admission, now normalized.  K stable.   # Essential HTN - Monitor BP.  Continue oral antihypertensives.  # Diabetes Type II - monitor fingersticks.  Insulin coverage for hyperglycemia.  # Hyperlipidemia - diet modification.  Continue Statin.  # Edema.  - history of CHF, outpatient followup with cardiology.  Appears euvolemic.  Monitor volume status.  # Inpatient DVT Prophylaxis - Lovenox subcut    Stable for GLORIA placement pending insurance auth, discussed with CM  Routine labs reviewed.  
71 years old female with h/o HTN, HLD, CHF, DM2, recently diagnosed Parkinson's diease present to ED with complain of worsening generalized weakness for 2 weeks.  Patient live with her sister and ambulate with wheelchair. Her sister reportedly has MS as well. Difficulty in taking care of herself due to weakness    # Parkinson's Disease ? compliance with Sinemet.  She was restarted on QID dosing with some improvement.  Increase to 5 doses daily.  # Generalized weakness - due to above.  PT input noted.  GLORIA placement.  # Hyperkalemia. - K is now low.  Will observe, was given Kayexalate on admission.  # Essential HTN - Monitor BP.  Continue oral antihypertensives.  # Diabetes Type II - monitor fingersticks.  Insulin coverage for hyperglycemia.  # Hyperlipidemia - diet modification.  Continue Statin.  # CHF, chronic.  ·  Plan: Reported h/o CHF, unknown EF Possible on lasix 40mg daily Clinically appear euvolemic Hold off diuretic for now until home medications is confirmed.    # Inpatient DVT Prophylaxis - Lovenox subcut    
71 years old female with h/o HTN, HLD, CHF, DM2, recently diagnosed Parkinson's diease present to ED with complain of worsening generalized weakness for 2 weeks.  Patient live with her sister and ambulate with wheelchair. Her sister reportedly has MS as well. Difficulty in taking care of herself due to weakness    # Parkinson's Disease due to suspected noncompliance with Sinemet.  Responding well to TID dosing.    # Generalized weakness - due to above.  PT input noted.  GLORIA placement.  CM aware.    # Hyperkalemia. - was given Kayexalate on admission, now normalized.   # Essential HTN - Monitor BP.  Continue oral antihypertensives.  # Diabetes Type II - monitor fingersticks.  Insulin coverage for hyperglycemia.  # Hyperlipidemia - diet modification.  Continue Statin.  # Edema.  - history of CHF, TTE unavailable.  Appears euvolemic.  Monitor volume status.  # Inpatient DVT Prophylaxis - Lovenox subcut    Stable for GLORIA placement pending insurance auth, discussed with CM

## 2021-10-04 NOTE — PROGRESS NOTE ADULT - SUBJECTIVE AND OBJECTIVE BOX
Patient: MELO DUNLAP [id number] 71y Female                            Hospitalist Attending Note    Pt reports feeling better.  Generalized weakness improving.  No complaints.     ____________________PHYSICAL EXAM:  GENERAL:  NAD Alert and Oriented x 3   HEENT: NCAT  CARDIOVASCULAR:  S1, S2  LUNGS: CTAB  ABDOMEN:  soft, (-) tenderness, (-) distension, (+) bowel sounds, (-) guarding, (-) rebound (-) rigidity  EXTREMITIES:  no cyanosis / clubbing / edema.   NEURO: strength symmetric, sensation intact.  Cogwheel rigidity.   ____________________     VITALS:  Vital Signs Last 24 Hrs  T(C): 37 (28 Sep 2021 13:43), Max: 37 (28 Sep 2021 13:43)  T(F): 98.6 (28 Sep 2021 13:43), Max: 98.6 (28 Sep 2021 13:43)  HR: 104 (28 Sep 2021 13:43) (80 - 104)  BP: 153/81 (28 Sep 2021 13:43) (142/85 - 167/90)  BP(mean): --  RR: 18 (28 Sep 2021 13:43) (17 - 18)  SpO2: 99% (28 Sep 2021 13:43) (98% - 99%) Daily Height in cm: 182.88 (27 Sep 2021 19:06)    Daily   CAPILLARY BLOOD GLUCOSE      POCT Blood Glucose.: 248 mg/dL (28 Sep 2021 11:23)  POCT Blood Glucose.: 99 mg/dL (28 Sep 2021 07:51)  POCT Blood Glucose.: 210 mg/dL (27 Sep 2021 22:01)  POCT Blood Glucose.: 110 mg/dL (27 Sep 2021 16:41)    I&O's Summary    28 Sep 2021 07:01  -  28 Sep 2021 16:13  --------------------------------------------------------  IN: 540 mL / OUT: 0 mL / NET: 540 mL        HISTORY:  PAST MEDICAL & SURGICAL HISTORY:  Heart failure    DM (diabetes mellitus)    Mild HTN    Parkinsons    Parkinsons    No significant past surgical history    Allergies    lisinopril (Other (Mild))    Intolerances       LABS:                        13.6   5.76  )-----------( 270      ( 28 Sep 2021 06:46 )             40.3     09-28    137  |  104  |  10  ----------------------------<  105<H>  3.2<L>   |  26  |  0.46<L>    Ca    9.4      28 Sep 2021 06:47  Phos  3.5     09-28  Mg     2.1     09-28    TPro  7.7  /  Alb  3.3  /  TBili  0.8  /  DBili  x   /  AST  43<H>  /  ALT  29  /  AlkPhos  98  09-27      LIVER FUNCTIONS - ( 27 Sep 2021 14:26 )  Alb: 3.3 g/dL / Pro: 7.7 gm/dL / ALK PHOS: 98 U/L / ALT: 29 U/L / AST: 43 U/L / GGT: x             CARDIAC MARKERS ( 27 Sep 2021 16:47 )  x     / x     / 60 U/L / x     / x      CARDIAC MARKERS ( 27 Sep 2021 14:26 )  <.015 ng/mL / x     / x     / x     / x              MEDICATIONS:  MEDICATIONS  (STANDING):  amLODIPine   Tablet 5 milliGRAM(s) Oral daily  atorvastatin 40 milliGRAM(s) Oral at bedtime  carbidopa/levodopa  25/100 1 Tablet(s) Oral four times a day  carbidopa/levodopa  25/100 1 Tablet(s) Oral <User Schedule>  dextrose 40% Gel 15 Gram(s) Oral once  dextrose 5%. 1000 milliLiter(s) (50 mL/Hr) IV Continuous <Continuous>  dextrose 5%. 1000 milliLiter(s) (100 mL/Hr) IV Continuous <Continuous>  dextrose 50% Injectable 25 Gram(s) IV Push once  dextrose 50% Injectable 12.5 Gram(s) IV Push once  dextrose 50% Injectable 25 Gram(s) IV Push once  enoxaparin Injectable 40 milliGRAM(s) SubCutaneous daily  glucagon  Injectable 1 milliGRAM(s) IntraMuscular once  influenza   Vaccine 0.5 milliLiter(s) IntraMuscular once  insulin lispro (ADMELOG) corrective regimen sliding scale   SubCutaneous three times a day before meals  insulin lispro (ADMELOG) corrective regimen sliding scale   SubCutaneous at bedtime    MEDICATIONS  (PRN):  acetaminophen   Tablet .. 650 milliGRAM(s) Oral every 6 hours PRN Mild Pain (1 - 3), Moderate Pain (4 - 6)  melatonin 3 milliGRAM(s) Oral at bedtime PRN Insomnia  
                          Patient: MELO DUNLAP [id number] 71y Female                            Hospitalist Attending Note    At baseline.  No complaints.     ____________________PHYSICAL EXAM:  GENERAL:  NAD Alert and Oriented x 3   HEENT: NCAT  CARDIOVASCULAR:  S1, S2  LUNGS: CTAB  ABDOMEN:  soft, (-) tenderness, (-) distension, (+) bowel sounds, (-) guarding, (-) rebound (-) rigidity  EXTREMITIES:  no cyanosis / clubbing / edema.   NEURO: strength symmetric, sensation intact.  Cogwheel rigidity.   ____________________    VITALS:  Vital Signs Last 24 Hrs  T(C): 37.1 (01 Oct 2021 16:55), Max: 37.1 (01 Oct 2021 16:55)  T(F): 98.7 (01 Oct 2021 16:55), Max: 98.7 (01 Oct 2021 16:55)  HR: 102 (01 Oct 2021 16:55) (65 - 102)  BP: 120/76 (01 Oct 2021 16:55) (120/76 - 148/85)  BP(mean): 91 (01 Oct 2021 10:42) (91 - 91)  RR: 18 (01 Oct 2021 16:55) (18 - 18)  SpO2: 99% (01 Oct 2021 16:55) (92% - 99%) Daily     Daily   CAPILLARY BLOOD GLUCOSE      POCT Blood Glucose.: 182 mg/dL (01 Oct 2021 16:09)  POCT Blood Glucose.: 271 mg/dL (01 Oct 2021 12:00)  POCT Blood Glucose.: 130 mg/dL (01 Oct 2021 08:07)  POCT Blood Glucose.: 248 mg/dL (30 Sep 2021 22:20)    I&O's Summary    30 Sep 2021 07:01  -  01 Oct 2021 07:00  --------------------------------------------------------  IN: 0 mL / OUT: 600 mL / NET: -600 mL        LABS:                        MEDICATIONS:  acetaminophen   Tablet .. 650 milliGRAM(s) Oral every 6 hours PRN  amLODIPine   Tablet 5 milliGRAM(s) Oral daily  atorvastatin 40 milliGRAM(s) Oral at bedtime  carbidopa/levodopa  25/100 1 Tablet(s) Oral three times a day  dextrose 40% Gel 15 Gram(s) Oral once  dextrose 5%. 1000 milliLiter(s) IV Continuous <Continuous>  dextrose 5%. 1000 milliLiter(s) IV Continuous <Continuous>  dextrose 50% Injectable 25 Gram(s) IV Push once  dextrose 50% Injectable 12.5 Gram(s) IV Push once  dextrose 50% Injectable 25 Gram(s) IV Push once  enoxaparin Injectable 40 milliGRAM(s) SubCutaneous daily  glucagon  Injectable 1 milliGRAM(s) IntraMuscular once  influenza   Vaccine 0.5 milliLiter(s) IntraMuscular once  insulin lispro (ADMELOG) corrective regimen sliding scale   SubCutaneous three times a day before meals  insulin lispro (ADMELOG) corrective regimen sliding scale   SubCutaneous at bedtime  melatonin 3 milliGRAM(s) Oral at bedtime PRN    
                          Patient: MELO DUNLAP [id number] 71y Female                            Hospitalist Attending Note    At baseline.  No complaints.  Sister, brother in law at bedside.     ____________________PHYSICAL EXAM:  GENERAL:  NAD Alert and Oriented x 3   HEENT: NCAT  CARDIOVASCULAR:  S1, S2  LUNGS: CTAB  ABDOMEN:  soft, (-) tenderness, (-) distension, (+) bowel sounds, (-) guarding, (-) rebound (-) rigidity  EXTREMITIES:  no cyanosis / clubbing / edema.   NEURO: strength symmetric, sensation intact.  Cogwheel rigidity, mild.   ____________________      VITALS:  Vital Signs Last 24 Hrs  T(C): 37.2 (04 Oct 2021 11:45), Max: 37.2 (04 Oct 2021 11:45)  T(F): 98.9 (04 Oct 2021 11:45), Max: 98.9 (04 Oct 2021 11:45)  HR: 89 (04 Oct 2021 11:45) (74 - 105)  BP: 145/81 (04 Oct 2021 11:45) (127/71 - 150/85)  BP(mean): --  RR: 18 (04 Oct 2021 11:45) (16 - 18)  SpO2: 97% (04 Oct 2021 11:45) (97% - 100%) Daily     Daily   CAPILLARY BLOOD GLUCOSE      POCT Blood Glucose.: 208 mg/dL (04 Oct 2021 11:30)  POCT Blood Glucose.: 184 mg/dL (04 Oct 2021 11:08)  POCT Blood Glucose.: 153 mg/dL (04 Oct 2021 08:11)  POCT Blood Glucose.: 188 mg/dL (03 Oct 2021 21:25)  POCT Blood Glucose.: 199 mg/dL (03 Oct 2021 16:15)    I&O's Summary    03 Oct 2021 07:01  -  04 Oct 2021 07:00  --------------------------------------------------------  IN: 0 mL / OUT: 1800 mL / NET: -1800 mL        LABS:                        14.1   5.51  )-----------( 258      ( 03 Oct 2021 07:50 )             43.6     10-03    134<L>  |  100  |  14  ----------------------------<  162<H>  4.2   |  30  |  0.58    Ca    9.7      03 Oct 2021 07:50                    MEDICATIONS:  acetaminophen   Tablet .. 650 milliGRAM(s) Oral every 6 hours PRN  amLODIPine   Tablet 5 milliGRAM(s) Oral daily  atorvastatin 40 milliGRAM(s) Oral at bedtime  carbidopa/levodopa  25/100 1 Tablet(s) Oral three times a day  dextrose 40% Gel 15 Gram(s) Oral once  dextrose 5%. 1000 milliLiter(s) IV Continuous <Continuous>  dextrose 5%. 1000 milliLiter(s) IV Continuous <Continuous>  dextrose 50% Injectable 25 Gram(s) IV Push once  dextrose 50% Injectable 12.5 Gram(s) IV Push once  dextrose 50% Injectable 25 Gram(s) IV Push once  enoxaparin Injectable 40 milliGRAM(s) SubCutaneous daily  glucagon  Injectable 1 milliGRAM(s) IntraMuscular once  influenza   Vaccine 0.5 milliLiter(s) IntraMuscular once  insulin lispro (ADMELOG) corrective regimen sliding scale   SubCutaneous three times a day before meals  insulin lispro (ADMELOG) corrective regimen sliding scale   SubCutaneous at bedtime  melatonin 3 milliGRAM(s) Oral at bedtime PRN    
                          Patient: MELO DUNLAP [id number] 71y Female                            Hospitalist Attending Note    Pt reports to be back to baseline.  States she has not been taking Sinemet, ? due to insurance coverage.    Generalized weakness improving.  No complaints.     ____________________PHYSICAL EXAM:  GENERAL:  NAD Alert and Oriented x 3   HEENT: NCAT  CARDIOVASCULAR:  S1, S2  LUNGS: CTAB  ABDOMEN:  soft, (-) tenderness, (-) distension, (+) bowel sounds, (-) guarding, (-) rebound (-) rigidity  EXTREMITIES:  no cyanosis / clubbing / edema.   NEURO: strength symmetric, sensation intact.  Cogwheel rigidity.   ____________________  VITALS:  Vital Signs Last 24 Hrs  T(C): 36.9 (29 Sep 2021 11:48), Max: 37 (28 Sep 2021 18:37)  T(F): 98.5 (29 Sep 2021 11:48), Max: 98.6 (28 Sep 2021 18:37)  HR: 94 (29 Sep 2021 11:48) (81 - 97)  BP: 151/51 (29 Sep 2021 11:48) (132/84 - 153/81)  BP(mean): --  RR: 17 (29 Sep 2021 11:48) (17 - 18)  SpO2: 98% (29 Sep 2021 11:48) (98% - 99%) Daily     Daily   CAPILLARY BLOOD GLUCOSE      POCT Blood Glucose.: 246 mg/dL (29 Sep 2021 11:58)  POCT Blood Glucose.: 122 mg/dL (29 Sep 2021 08:09)  POCT Blood Glucose.: 129 mg/dL (28 Sep 2021 22:46)  POCT Blood Glucose.: 157 mg/dL (28 Sep 2021 18:43)    I&O's Summary    28 Sep 2021 07:01  -  29 Sep 2021 07:00  --------------------------------------------------------  IN: 540 mL / OUT: 1400 mL / NET: -860 mL    29 Sep 2021 07:01  -  29 Sep 2021 15:20  --------------------------------------------------------  IN: 580 mL / OUT: 250 mL / NET: 330 mL        LABS:                        13.6   5.76  )-----------( 270      ( 28 Sep 2021 06:46 )             40.3     09-29    139  |  106  |  11  ----------------------------<  135<H>  4.4   |  29  |  0.68    Ca    9.1      29 Sep 2021 07:28  Phos  3.5     09-28  Mg     2.1     09-28            CARDIAC MARKERS ( 27 Sep 2021 16:47 )  x     / x     / 60 U/L / x     / x              MEDICATIONS:  acetaminophen   Tablet .. 650 milliGRAM(s) Oral every 6 hours PRN  amLODIPine   Tablet 5 milliGRAM(s) Oral daily  atorvastatin 40 milliGRAM(s) Oral at bedtime  carbidopa/levodopa  25/100 1 Tablet(s) Oral three times a day  dextrose 40% Gel 15 Gram(s) Oral once  dextrose 5%. 1000 milliLiter(s) IV Continuous <Continuous>  dextrose 5%. 1000 milliLiter(s) IV Continuous <Continuous>  dextrose 50% Injectable 25 Gram(s) IV Push once  dextrose 50% Injectable 12.5 Gram(s) IV Push once  dextrose 50% Injectable 25 Gram(s) IV Push once  enoxaparin Injectable 40 milliGRAM(s) SubCutaneous daily  glucagon  Injectable 1 milliGRAM(s) IntraMuscular once  influenza   Vaccine 0.5 milliLiter(s) IntraMuscular once  insulin lispro (ADMELOG) corrective regimen sliding scale   SubCutaneous three times a day before meals  insulin lispro (ADMELOG) corrective regimen sliding scale   SubCutaneous at bedtime  melatonin 3 milliGRAM(s) Oral at bedtime PRN    
                          Patient: MELO DUNLAP [id number] 71y Female                            Hospitalist Attending Note    Pt reports to be back to baseline.  No complaints.     ____________________PHYSICAL EXAM:  GENERAL:  NAD Alert and Oriented x 3   HEENT: NCAT  CARDIOVASCULAR:  S1, S2  LUNGS: CTAB  ABDOMEN:  soft, (-) tenderness, (-) distension, (+) bowel sounds, (-) guarding, (-) rebound (-) rigidity  EXTREMITIES:  no cyanosis / clubbing / edema.   NEURO: strength symmetric, sensation intact.  Cogwheel rigidity.   ____________________    VITALS:  Vital Signs Last 24 Hrs  T(C): 36.8 (30 Sep 2021 17:05), Max: 36.8 (30 Sep 2021 17:05)  T(F): 98.2 (30 Sep 2021 17:05), Max: 98.2 (30 Sep 2021 17:05)  HR: 93 (30 Sep 2021 17:05) (72 - 101)  BP: 111/76 (30 Sep 2021 17:05) (111/76 - 153/83)  BP(mean): --  RR: 18 (30 Sep 2021 17:05) (17 - 18)  SpO2: 99% (30 Sep 2021 17:05) (97% - 99%) Daily     Daily   CAPILLARY BLOOD GLUCOSE      POCT Blood Glucose.: 210 mg/dL (30 Sep 2021 16:37)  POCT Blood Glucose.: 212 mg/dL (30 Sep 2021 11:11)  POCT Blood Glucose.: 130 mg/dL (30 Sep 2021 08:27)  POCT Blood Glucose.: 225 mg/dL (29 Sep 2021 22:34)    I&O's Summary    29 Sep 2021 07:01  -  30 Sep 2021 07:00  --------------------------------------------------------  IN: 820 mL / OUT: 1450 mL / NET: -630 mL        LABS:    09-29    139  |  106  |  11  ----------------------------<  135<H>  4.4   |  29  |  0.68    Ca    9.1      29 Sep 2021 07:28                    MEDICATIONS:  acetaminophen   Tablet .. 650 milliGRAM(s) Oral every 6 hours PRN  amLODIPine   Tablet 5 milliGRAM(s) Oral daily  atorvastatin 40 milliGRAM(s) Oral at bedtime  carbidopa/levodopa  25/100 1 Tablet(s) Oral three times a day  dextrose 40% Gel 15 Gram(s) Oral once  dextrose 5%. 1000 milliLiter(s) IV Continuous <Continuous>  dextrose 5%. 1000 milliLiter(s) IV Continuous <Continuous>  dextrose 50% Injectable 25 Gram(s) IV Push once  dextrose 50% Injectable 12.5 Gram(s) IV Push once  dextrose 50% Injectable 25 Gram(s) IV Push once  enoxaparin Injectable 40 milliGRAM(s) SubCutaneous daily  glucagon  Injectable 1 milliGRAM(s) IntraMuscular once  influenza   Vaccine 0.5 milliLiter(s) IntraMuscular once  insulin lispro (ADMELOG) corrective regimen sliding scale   SubCutaneous three times a day before meals  insulin lispro (ADMELOG) corrective regimen sliding scale   SubCutaneous at bedtime  melatonin 3 milliGRAM(s) Oral at bedtime PRN    
                          Patient: MELO DUNLAP [id number] 71y Female                            Hospitalist Attending Note    At baseline.  No complaints.     ____________________PHYSICAL EXAM:  GENERAL:  NAD Alert and Oriented x 3   HEENT: NCAT  CARDIOVASCULAR:  S1, S2  LUNGS: CTAB  ABDOMEN:  soft, (-) tenderness, (-) distension, (+) bowel sounds, (-) guarding, (-) rebound (-) rigidity  EXTREMITIES:  no cyanosis / clubbing / edema.   NEURO: strength symmetric, sensation intact.  Cogwheel rigidity.   ____________________    VITALS:  Vital Signs Last 24 Hrs  T(C): 36.9 (02 Oct 2021 13:25), Max: 37.1 (01 Oct 2021 16:55)  T(F): 98.4 (02 Oct 2021 13:25), Max: 98.7 (01 Oct 2021 16:55)  HR: 96 (02 Oct 2021 13:25) (79 - 102)  BP: 129/88 (02 Oct 2021 13:25) (120/76 - 137/81)  BP(mean): --  RR: 18 (02 Oct 2021 13:25) (18 - 18)  SpO2: 100% (02 Oct 2021 13:25) (97% - 100%) Daily     Daily   CAPILLARY BLOOD GLUCOSE      POCT Blood Glucose.: 214 mg/dL (02 Oct 2021 12:12)  POCT Blood Glucose.: 136 mg/dL (02 Oct 2021 07:25)  POCT Blood Glucose.: 253 mg/dL (01 Oct 2021 21:08)    I&O's Summary    01 Oct 2021 07:01  -  02 Oct 2021 07:00  --------------------------------------------------------  IN: 0 mL / OUT: 600 mL / NET: -600 mL    02 Oct 2021 07:01  -  02 Oct 2021 16:18  --------------------------------------------------------  IN: 0 mL / OUT: 1000 mL / NET: -1000 mL        LABS:                        MEDICATIONS:  acetaminophen   Tablet .. 650 milliGRAM(s) Oral every 6 hours PRN  amLODIPine   Tablet 5 milliGRAM(s) Oral daily  atorvastatin 40 milliGRAM(s) Oral at bedtime  carbidopa/levodopa  25/100 1 Tablet(s) Oral three times a day  dextrose 40% Gel 15 Gram(s) Oral once  dextrose 5%. 1000 milliLiter(s) IV Continuous <Continuous>  dextrose 5%. 1000 milliLiter(s) IV Continuous <Continuous>  dextrose 50% Injectable 25 Gram(s) IV Push once  dextrose 50% Injectable 12.5 Gram(s) IV Push once  dextrose 50% Injectable 25 Gram(s) IV Push once  enoxaparin Injectable 40 milliGRAM(s) SubCutaneous daily  glucagon  Injectable 1 milliGRAM(s) IntraMuscular once  influenza   Vaccine 0.5 milliLiter(s) IntraMuscular once  insulin lispro (ADMELOG) corrective regimen sliding scale   SubCutaneous three times a day before meals  insulin lispro (ADMELOG) corrective regimen sliding scale   SubCutaneous at bedtime  melatonin 3 milliGRAM(s) Oral at bedtime PRN    
                          Patient: MELO DUNLAP [id number] 71y Female                            Hospitalist Attending Note    At baseline.  No complaints.     ____________________PHYSICAL EXAM:  GENERAL:  NAD Alert and Oriented x 3   HEENT: NCAT  CARDIOVASCULAR:  S1, S2  LUNGS: CTAB  ABDOMEN:  soft, (-) tenderness, (-) distension, (+) bowel sounds, (-) guarding, (-) rebound (-) rigidity  EXTREMITIES:  no cyanosis / clubbing / edema.   NEURO: strength symmetric, sensation intact.  Cogwheel rigidity.   ____________________    VITALS:  Vital Signs Last 24 Hrs  T(C): 36.8 (03 Oct 2021 12:40), Max: 36.8 (03 Oct 2021 12:40)  T(F): 98.2 (03 Oct 2021 12:40), Max: 98.2 (03 Oct 2021 12:40)  HR: 88 (03 Oct 2021 12:40) (69 - 96)  BP: 123/71 (03 Oct 2021 12:40) (123/71 - 142/100)  BP(mean): --  RR: 17 (03 Oct 2021 12:40) (17 - 18)  SpO2: 100% (03 Oct 2021 12:40) (98% - 100%) Daily     Daily   CAPILLARY BLOOD GLUCOSE      POCT Blood Glucose.: 187 mg/dL (03 Oct 2021 12:06)  POCT Blood Glucose.: 154 mg/dL (03 Oct 2021 07:54)  POCT Blood Glucose.: 240 mg/dL (02 Oct 2021 22:14)  POCT Blood Glucose.: 135 mg/dL (02 Oct 2021 16:50)    I&O's Summary    02 Oct 2021 07:01  -  03 Oct 2021 07:00  --------------------------------------------------------  IN: 0 mL / OUT: 2400 mL / NET: -2400 mL        LABS:                        14.1   5.51  )-----------( 258      ( 03 Oct 2021 07:50 )             43.6     10-03    134<L>  |  100  |  14  ----------------------------<  162<H>  4.2   |  30  |  0.58    Ca    9.7      03 Oct 2021 07:50                    MEDICATIONS:  acetaminophen   Tablet .. 650 milliGRAM(s) Oral every 6 hours PRN  amLODIPine   Tablet 5 milliGRAM(s) Oral daily  atorvastatin 40 milliGRAM(s) Oral at bedtime  carbidopa/levodopa  25/100 1 Tablet(s) Oral three times a day  dextrose 40% Gel 15 Gram(s) Oral once  dextrose 5%. 1000 milliLiter(s) IV Continuous <Continuous>  dextrose 5%. 1000 milliLiter(s) IV Continuous <Continuous>  dextrose 50% Injectable 25 Gram(s) IV Push once  dextrose 50% Injectable 12.5 Gram(s) IV Push once  dextrose 50% Injectable 25 Gram(s) IV Push once  enoxaparin Injectable 40 milliGRAM(s) SubCutaneous daily  glucagon  Injectable 1 milliGRAM(s) IntraMuscular once  influenza   Vaccine 0.5 milliLiter(s) IntraMuscular once  insulin lispro (ADMELOG) corrective regimen sliding scale   SubCutaneous at bedtime  insulin lispro (ADMELOG) corrective regimen sliding scale   SubCutaneous three times a day before meals  melatonin 3 milliGRAM(s) Oral at bedtime PRN    
Neurology Follow up note    Name  MELO DUNLAP    HPI:  71 years old female with h/o HTN, HLD, CHF, DM2, recently diagnosed Parkinson's diease present to ED with complain of worsening generalized weakness for 2 weeks. Patient reported gradual worsening of weakness since diagnosis of Parkinson, but more pronounced over last 2 week. Now patient cannot ambulate due to weakness. She live with her sister and ambulate with a wheelchair. No fever, cough, SOB or chest pain.  Hemodynamically stable. No leukocytosis. K 5.5, trop negative. CT head with no acute pathology, mid to moderate chronic microvascular changes.ED consulted neurology Dr Malone. Given 1 dose of Sinemet 25/100 in ED  Called [facility] pharmacy ( [phone]). They are not sure of patient's regular medications She was discharged in July with Sinemet 25/100 qid. Other medications at different point of times include lasix 40mg daily, metformin 500mg, amlodipine 5mg, atorvastatin 40mg, metoprolol ER 50 but they are not sure if patient is still taking them.    PMH: as in HPI  SH: no toxic habits  FH: DM in father and mother (27 Sep 2021 15:19)    14 point ROS negative    Interval History - Patient seen and examined this am. no new complaints            Vital Signs Last 24 Hrs  T(C): 36.3 (03 Oct 2021 05:00), Max: 36.9 (02 Oct 2021 13:25)  T(F): 97.4 (03 Oct 2021 05:00), Max: 98.4 (02 Oct 2021 13:25)  HR: 80 (03 Oct 2021 05:00) (69 - 96)  BP: 142/100 (03 Oct 2021 05:00) (127/80 - 142/100)  BP(mean): --  RR: 18 (03 Oct 2021 05:00) (18 - 18)  SpO2: 100% (03 Oct 2021 05:00) (98% - 100%)    PHYSICAL EXAM:    MS: Awake, alert, oriented x 4, language fluent, comprehension intact, naming intact, repetition intact, reduced facial expression  CN: PERRLA, EOMI, visual fields intact, facial sensation intact, face symmetric, no dysarthria, symmetric palatal elevation, tongue midline, symmetric shoulder shrug and SCM strength  Motor: normal bulk, diffuse rigidity, power 5/5 in all four extremities, no tremors or fasciculations, +bradykinesia in all four limbs  Sensation: intact to light touch, proprioception, impaired vibrations sense in toes bilaterally  Reflexes: 1+ at biceps, brachioradialis, patella, ankle bilaterally.  Flexor plantar response bilaterally.  No clonus  Coordination: no dysmetria    Medications  acetaminophen   Tablet .. 650 milliGRAM(s) Oral every 6 hours PRN  amLODIPine   Tablet 5 milliGRAM(s) Oral daily  atorvastatin 40 milliGRAM(s) Oral at bedtime  carbidopa/levodopa  25/100 1 Tablet(s) Oral three times a day  dextrose 40% Gel 15 Gram(s) Oral once  dextrose 5%. 1000 milliLiter(s) IV Continuous <Continuous>  dextrose 5%. 1000 milliLiter(s) IV Continuous <Continuous>  dextrose 50% Injectable 25 Gram(s) IV Push once  dextrose 50% Injectable 12.5 Gram(s) IV Push once  dextrose 50% Injectable 25 Gram(s) IV Push once  enoxaparin Injectable 40 milliGRAM(s) SubCutaneous daily  glucagon  Injectable 1 milliGRAM(s) IntraMuscular once  influenza   Vaccine 0.5 milliLiter(s) IntraMuscular once  insulin lispro (ADMELOG) corrective regimen sliding scale   SubCutaneous at bedtime  insulin lispro (ADMELOG) corrective regimen sliding scale   SubCutaneous three times a day before meals  melatonin 3 milliGRAM(s) Oral at bedtime PRN      Lab      Radiology    < from: CT Head No Cont (09.27.21 @ 13:57) >  IMPRESSION:  Mild to moderate chronic microvascular changes without evidence of an acute transcortical infarction or hemorrhage.    --- End of Report ---    < end of copied text >  
Neurology Follow up note    Name  MELO DUNLAP    HPI:  71 years old female with h/o HTN, HLD, CHF, DM2, recently diagnosed Parkinson's diease present to ED with complain of worsening generalized weakness for 2 weeks. Patient reported gradual worsening of weakness since diagnosis of Parkinson, but more pronounced over last 2 week. Now patient cannot ambulate due to weakness. She live with her sister and ambulate with a wheelchair. No fever, cough, SOB or chest pain.  Hemodynamically stable. No leukocytosis. K 5.5, trop negative. CT head with no acute pathology, mid to moderate chronic microvascular changes.ED consulted neurology Dr Malone. Given 1 dose of Sinemet 25/100 in ED  Called [facility] pharmacy ( [phone]). They are not sure of patient's regular medications She was discharged in July with Sinemet 25/100 qid. Other medications at different point of times include lasix 40mg daily, metformin 500mg, amlodipine 5mg, atorvastatin 40mg, metoprolol ER 50 but they are not sure if patient is still taking them.    Patient seen and examined this am, no new complainst    MEDICATIONS  (STANDING):  amLODIPine   Tablet 5 milliGRAM(s) Oral daily  atorvastatin 40 milliGRAM(s) Oral at bedtime  carbidopa/levodopa  25/100 1 Tablet(s) Oral three times a day  dextrose 40% Gel 15 Gram(s) Oral once  dextrose 5%. 1000 milliLiter(s) (50 mL/Hr) IV Continuous <Continuous>  dextrose 5%. 1000 milliLiter(s) (100 mL/Hr) IV Continuous <Continuous>  dextrose 50% Injectable 25 Gram(s) IV Push once  dextrose 50% Injectable 12.5 Gram(s) IV Push once  dextrose 50% Injectable 25 Gram(s) IV Push once  enoxaparin Injectable 40 milliGRAM(s) SubCutaneous daily  glucagon  Injectable 1 milliGRAM(s) IntraMuscular once  influenza   Vaccine 0.5 milliLiter(s) IntraMuscular once  insulin lispro (ADMELOG) corrective regimen sliding scale   SubCutaneous three times a day before meals  insulin lispro (ADMELOG) corrective regimen sliding scale   SubCutaneous at bedtime    MEDICATIONS  (PRN):  acetaminophen   Tablet .. 650 milliGRAM(s) Oral every 6 hours PRN Mild Pain (1 - 3), Moderate Pain (4 - 6)  melatonin 3 milliGRAM(s) Oral at bedtime PRN Insomnia    Vital Signs Last 24 Hrs  T(C): 36.6 (04 Oct 2021 05:13), Max: 36.8 (03 Oct 2021 12:40)  T(F): 97.9 (04 Oct 2021 05:13), Max: 98.2 (03 Oct 2021 12:40)  HR: 76 (04 Oct 2021 05:13) (74 - 105)  BP: 150/85 (04 Oct 2021 05:13) (123/71 - 150/85)  BP(mean): --  RR: 16 (04 Oct 2021 05:13) (16 - 17)  SpO2: 100% (04 Oct 2021 05:13) (98% - 100%)  PHYSICAL EXAM:    MS: Awake, alert, oriented x 4, language fluent, comprehension intact, naming intact, repetition intact, reduced facial expression  CN: PERRLA, EOMI, visual fields intact, facial sensation intact, face symmetric, no dysarthria, symmetric palatal elevation, tongue midline, symmetric shoulder shrug and SCM strength  Motor: normal bulk, diffuse rigidity, power 5/5 in all four extremities, no tremors or fasciculations, +bradykinesia in all four limbs  Sensation: intact to light touch, proprioception, impaired vibrations sense in toes bilaterally  Reflexes: 1+ at biceps, brachioradialis, patella, ankle bilaterally.  Flexor plantar response bilaterally.  No clonus  Coordination: no dysmetria        Radiology    < from: CT Head No Cont (09.27.21 @ 13:57) >  IMPRESSION:  Mild to moderate chronic microvascular changes without evidence of an acute transcortical infarction or hemorrhage.    --- End of Report ---    < end of copied text >

## 2021-10-04 NOTE — DISCHARGE NOTE PROVIDER - CARE PROVIDER_API CALL
Fernando Malone)  Clinical Neurophysiology; Neurology  [address]  [place] [address]  Phone: [phone]  Fax: [phone]  Follow Up Time:

## 2021-10-04 NOTE — PROGRESS NOTE ADULT - PROVIDER SPECIALTY LIST ADULT
Hospitalist
Neurology
Neurology
Hospitalist

## 2021-10-04 NOTE — DISCHARGE NOTE PROVIDER - NSDCFUADDINST_GEN_ALL_CORE_FT
It is important to see your primary physician as well as the physicians noted below within the next week to perform a comprehensive medical review.  Call their offices for an appointment as soon as you leave the hospital.  You will also need to see them for renewal of your medications.  If you do not have a primary physician, contact the [facility] Physician Referral Service at [phone].  To obtain your results, you can access the [facility] Patient Portal at [email or web address].  Your medical issues appear to be stable at this time, but if your symptoms recur or worsen, contact your physicians and/or return to the hospital if necessary.  If you encounter any issues or questions with your medication, call your physicians before stopping the medication.  Do not drive.  Limit your diet to 2 grams of sodium daily.    Requires assistance with walking.

## 2021-10-04 NOTE — DISCHARGE NOTE PROVIDER - NSDCMRMEDTOKEN_GEN_ALL_CORE_FT
amLODIPine 5 mg oral tablet: 1 tab(s) orally once a day  atorvastatin 40 mg oral tablet: 1 tab(s) orally once a day (at bedtime)  carbidopa-levodopa 25 mg-100 mg oral tablet: 1 tab(s) orally 3 times a day

## 2021-10-04 NOTE — DISCHARGE NOTE PROVIDER - NSDCCPCAREPLAN_GEN_ALL_CORE_FT
PRINCIPAL DISCHARGE DIAGNOSIS  Diagnosis: Parkinsons disease  Assessment and Plan of Treatment:       SECONDARY DISCHARGE DIAGNOSES  Diagnosis: Benign essential HTN  Assessment and Plan of Treatment:     Diagnosis: Hyperlipidemia  Assessment and Plan of Treatment:     Diagnosis: Generalized weakness  Assessment and Plan of Treatment:

## 2021-10-04 NOTE — PROGRESS NOTE ADULT - REASON FOR ADMISSION
generalized weakness, worsening of Parkinson's disease

## 2021-10-04 NOTE — DISCHARGE NOTE PROVIDER - HOSPITAL COURSE
71 years old female with h/o HTN, HLD, CHF, DM2, recently diagnosed Parkinson's diease present to ED with complain of worsening generalized weakness for 2 weeks.  Patient live with her sister and ambulate with wheelchair. Her sister reportedly has MS as well. Difficulty in taking care of herself due to weakness    # Parkinson's Disease due to suspected noncompliance with Sinemet.  Responding well to TID dosing.    # Generalized weakness - due to above.  PT input noted.  CM aware.    # Hyperkalemia. - was given Kayexalate on admission, now normalized.  K stable.   # Essential HTN - Monitor BP, stable  # Diabetes Type II - monitor fingersticks.  Insulin coverage for hyperglycemia.  # Hyperlipidemia - diet modification.  Continue Statin.  # Edema.  - history of CHF, outpatient followup with cardiology.  Appears euvolemic.  Monitor volume status.  # Inpatient DVT Prophylaxis - Lovenox subcut    Pt insured, undocumented.  D/w CM.  D/w pt and family.  Plan d/c home with homecare services.  They were in agreement.   Disposition: Stable for discharge.  Outpatient followup discussed.  Total time spent on discharge is  50 minutes.

## 2021-10-04 NOTE — DISCHARGE NOTE NURSING/CASE MANAGEMENT/SOCIAL WORK - PATIENT PORTAL LINK FT
You can access the FollowMyHealth Patient Portal offered by [facility] by registering at the following website: [email or web address]. By joining [facility]’s FollowMyHealth portal, you will also be able to view your health information using other applications (apps) compatible with our system.

## 2021-10-08 RX ORDER — CARBIDOPA AND LEVODOPA 25; 100 MG/1; MG/1
1 TABLET ORAL
Qty: 90 | Refills: 0
Start: 2021-10-08

## 2021-10-14 DIAGNOSIS — R53.1 WEAKNESS: ICD-10-CM

## 2021-10-14 DIAGNOSIS — E78.5 HYPERLIPIDEMIA, UNSPECIFIED: ICD-10-CM

## 2021-10-14 DIAGNOSIS — Z91.14 PATIENT'S OTHER NONCOMPLIANCE WITH MEDICATION REGIMEN: ICD-10-CM

## 2021-10-14 DIAGNOSIS — E11.9 TYPE 2 DIABETES MELLITUS WITHOUT COMPLICATIONS: ICD-10-CM

## 2021-10-14 DIAGNOSIS — I50.9 HEART FAILURE, UNSPECIFIED: ICD-10-CM

## 2021-10-14 DIAGNOSIS — E87.5 HYPERKALEMIA: ICD-10-CM

## 2021-10-14 DIAGNOSIS — Z88.9 ALLERGY STATUS TO UNSPECIFIED DRUGS, MEDICAMENTS AND BIOLOGICAL SUBSTANCES: ICD-10-CM

## 2021-10-14 DIAGNOSIS — Z79.84 LONG TERM (CURRENT) USE OF ORAL HYPOGLYCEMIC DRUGS: ICD-10-CM

## 2021-10-14 DIAGNOSIS — G20 PARKINSON'S DISEASE: ICD-10-CM
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
## 2021-10-14 DIAGNOSIS — T44.996A: ICD-10-CM

## 2021-10-14 DIAGNOSIS — I11.0 HYPERTENSIVE HEART DISEASE WITH HEART FAILURE: ICD-10-CM

## 2024-11-13 NOTE — H&P ADULT - PROBLEM SELECTOR PROBLEM 5
You have pneumonia at the top of the left lung.  This could be a simple bacterial pneumonia or might be atypical pneumonia.  Take both antibiotics as prescribed.  Start them tomorrow.    Take the prednisone as directed, starting tomorrow.  This will help with wheezing, chest tightness and cough.    Use the Proventil (albuterol) inhaler, 2 puffs every 4-6 hours as needed for wheezing, chest tightness and frequent cough.    Follow instructions below.  You should contact your PCP next week to let them know how you are doing.  Call them sooner if worse.    You should have a follow-up chest x-ray or CT scan in 10 to 12 weeks as noted below.  This should be ordered through your PCP.   Type 2 diabetes mellitus with unspecified complications